# Patient Record
Sex: FEMALE | Race: WHITE | HISPANIC OR LATINO | Employment: FULL TIME | URBAN - METROPOLITAN AREA
[De-identification: names, ages, dates, MRNs, and addresses within clinical notes are randomized per-mention and may not be internally consistent; named-entity substitution may affect disease eponyms.]

---

## 2024-05-04 ENCOUNTER — HOSPITAL ENCOUNTER (EMERGENCY)
Facility: CLINIC | Age: 54
Discharge: HOME | End: 2024-05-04
Attending: EMERGENCY MEDICINE | Admitting: EMERGENCY MEDICINE
Payer: COMMERCIAL

## 2024-05-04 VITALS
HEART RATE: 83 BPM | OXYGEN SATURATION: 99 % | RESPIRATION RATE: 20 BRPM | HEIGHT: 61 IN | DIASTOLIC BLOOD PRESSURE: 96 MMHG | WEIGHT: 193.12 LBS | SYSTOLIC BLOOD PRESSURE: 162 MMHG | TEMPERATURE: 98.5 F | BODY MASS INDEX: 36.46 KG/M2

## 2024-05-04 DIAGNOSIS — J01.90 ACUTE SINUSITIS WITH SYMPTOMS > 10 DAYS: ICD-10-CM

## 2024-05-04 LAB
FLUAV RNA SPEC QL NAA+PROBE: NEGATIVE
FLUBV RNA RESP QL NAA+PROBE: NEGATIVE
RSV RNA SPEC NAA+PROBE: NEGATIVE
SARS-COV-2 RNA RESP QL NAA+PROBE: NEGATIVE

## 2024-05-04 PROCEDURE — 87637 SARSCOV2&INF A&B&RSV AMP PRB: CPT | Performed by: EMERGENCY MEDICINE

## 2024-05-04 PROCEDURE — 99283 EMERGENCY DEPT VISIT LOW MDM: CPT

## 2024-05-04 RX ORDER — DOXYCYCLINE 100 MG/1
100 CAPSULE ORAL 2 TIMES DAILY
Qty: 20 CAPSULE | Refills: 0 | Status: SHIPPED | OUTPATIENT
Start: 2024-05-04 | End: 2024-05-14

## 2024-05-04 RX ORDER — METOPROLOL SUCCINATE 25 MG/1
25 TABLET, EXTENDED RELEASE ORAL DAILY
COMMUNITY

## 2024-05-04 RX ORDER — FLUTICASONE PROPIONATE 50 MCG
1 SPRAY, SUSPENSION (ML) NASAL DAILY
Qty: 11 ML | Refills: 0 | Status: SHIPPED | OUTPATIENT
Start: 2024-05-04

## 2024-05-04 ASSESSMENT — COLUMBIA-SUICIDE SEVERITY RATING SCALE - C-SSRS
6. HAVE YOU EVER DONE ANYTHING, STARTED TO DO ANYTHING, OR PREPARED TO DO ANYTHING TO END YOUR LIFE?: NO
1. IN THE PAST MONTH, HAVE YOU WISHED YOU WERE DEAD OR WISHED YOU COULD GO TO SLEEP AND NOT WAKE UP?: NO
2. HAVE YOU ACTUALLY HAD ANY THOUGHTS OF KILLING YOURSELF IN THE PAST MONTH?: NO

## 2024-05-04 ASSESSMENT — ACTIVITIES OF DAILY LIVING (ADL): ADLS_ACUITY_SCORE: 33

## 2024-05-04 NOTE — ED PROVIDER NOTES
"  Emergency Department Note      History of Present Illness     Chief Complaint  Flu Symptoms    HPI  Cortney Garcia is a 53 year old female who presents with flu symptoms. 2 and a half weeks ago patient began to have nasal congestion and discomfort as well as yellow/green nasal drainage. A week ago the congestion worsened and she developed headache. She is also have shortness of breath and chest pain with non productive coughing. Also notes sore throat and ear pain but denies fever. No history of asthma or allergies. Notes IV contrast allergy. She has taken nyquil without relief.     Independent Historian  None    Review of External Notes  None    Past Medical History   Medical History and Problem List  Past Medical History:   Diagnosis Date    Hypertension     Ovarian cyst    TB lung, latent   GERD  Endometriosis     Medications  metoprolol succinate ER (TOPROL XL) 25 MG 24 hr tablet    Surgical History   Past Surgical History:   Procedure Laterality Date    CHOLECYSTECTOMY  2005    LAPAROSCOPY, lysis of adhesion, KTP ablation of endometriosis, HYSTEROSCOPY DILATION & CURETTAGE  10/2012     Physical Exam   Patient Vitals for the past 24 hrs:   BP Temp Temp src Pulse Resp SpO2 Height Weight   05/04/24 0032 (!) 162/96 98.5  F (36.9  C) Temporal 83 20 99 % 1.549 m (5' 1\") 87.6 kg (193 lb 2 oz)     Physical Exam  Nursing note and vitals reviewed  Constitutional: Cooperative.  Sitting up comfortably  HENT:   Mouth/Throat: Mucous membranes are normal.   Cardiovascular: Normal rate, regular rhythm and normal heart sounds.  No murmur.  Pulmonary/Chest: Effort normal and breath sounds normal. No respiratory distress. No wheezes. No rales.   Abdominal: Normal appearance  Neurological: Alert.  Oriented x 3  Skin: Skin is warm and dry.   Psychiatric: Normal mood and affect.     Diagnostics   Lab Results   Viral swab for COVID, influenza and RSV are pending     Independent Interpretation  None    ED Course  "   Medications Administered  Medications - No data to display    Discussion of Management  None    Social Determinants of Health adding to complexity of care  None    ED Course  Past medical records, nursing notes, and vitals reviewed.    Medical Decision Making / Diagnosis     GRACIELA Garcia is a 53 year old female who presents for evaluation of facial pain.  Signs and symptoms are consistent with sinusitis.  Discussed viral vs bacterial sinusitis with the patient.  Given time course of symptoms being over 14 days of it is reasonable to treat with antibiotics.  Outpatient medications ordered as noted above.  Doubt fungal sinusitis, meningitis, encephalitis, cavernous sinus thrombosis, ocular pathology, intracerebral bleed, serious bacterial infection otherwise.  Supportive outpatient management indicated.       Disposition  The patient was discharged.     ICD-10 Codes:    ICD-10-CM    1. Acute sinusitis with symptoms > 10 days  J01.90          Discharge Medications  New Prescriptions    DOXYCYCLINE HYCLATE (VIBRAMYCIN) 100 MG CAPSULE    Take 1 capsule (100 mg) by mouth 2 times daily for 10 days    FLUTICASONE (FLONASE) 50 MCG/ACT NASAL SPRAY    Spray 1 spray into both nostrils daily     Scribe Disclosure:  Roby POLANCO, am serving as a scribe at 12:49 AM on 5/4/2024 to document services personally performed by Myron Auguste MD based on my observations and the provider's statements to me.     [employer]       Myron Auguste MD  05/04/24 0133

## 2024-05-04 NOTE — ED TRIAGE NOTES
Pt arrives for flu symptoms, congestion, fever, headache x1 week. Tylenol taken around 0000. States it is hard to breathe due to congestion. AVSS on RA.